# Patient Record
Sex: FEMALE | Race: WHITE
[De-identification: names, ages, dates, MRNs, and addresses within clinical notes are randomized per-mention and may not be internally consistent; named-entity substitution may affect disease eponyms.]

---

## 2018-08-22 ENCOUNTER — HOSPITAL ENCOUNTER (EMERGENCY)
Dept: HOSPITAL 97 - ER | Age: 83
Discharge: HOME | End: 2018-08-22
Payer: COMMERCIAL

## 2018-08-22 VITALS — SYSTOLIC BLOOD PRESSURE: 158 MMHG | DIASTOLIC BLOOD PRESSURE: 74 MMHG

## 2018-08-22 VITALS — TEMPERATURE: 98.4 F

## 2018-08-22 VITALS — OXYGEN SATURATION: 99 %
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
## 2018-08-22 DIAGNOSIS — S81.852A: Primary | ICD-10-CM

## 2018-08-22 DIAGNOSIS — E03.9: ICD-10-CM

## 2018-08-22 DIAGNOSIS — I10: ICD-10-CM

## 2018-08-22 DIAGNOSIS — Z88.0: ICD-10-CM

## 2018-08-22 DIAGNOSIS — Z23: ICD-10-CM
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
## 2018-08-22 PROCEDURE — 99284 EMERGENCY DEPT VISIT MOD MDM: CPT

## 2018-08-22 PROCEDURE — 90714 TD VACC NO PRESV 7 YRS+ IM: CPT

## 2018-08-22 NOTE — EDPHYS
Physician Documentation                                                                           

 [facility]                                                                

Name: Mirtha Suggs                                                                               

Age: 83 yrs                                                                                       

Sex: Female                                                                                       

: 1934                                                                                   

MRN: [id number]                                                                                   

Arrival Date: 2018                                                                          

Time: 18:32                                                                                       

Account#: [id number]                                                                            

Bed 14                                                                                            

Private MD: LISSETTE Rey C                                                                            

ED Physician Deepak Bird                                                                       

HPI:                                                                                              

                                                                                             

21:15 This 83 yrs old  Female presents to ER via Ambulatory with complaints of Dog   gs  

      Bite.                                                                                       

21:15 The patient was bitten on the left calf. Onset: The symptoms/episode began/occurred     gs  

      acutely, just prior to arrival. Animal information: The animal was reported to appear       

      healthy. Animal's vaccinations are up to date. Animal control has been notified.            

      Secondary to the bite the patient reports a puncture wound, that is deep. Associated        

      signs and symptoms: Pertinent positives: pain at site, Pertinent negatives: erythema at     

      site, fever, fluctuance, loss of consciousness, motor deficit, numbness distal to           

      wound, suspected foreign body. Severity of symptoms: At their worst the symptoms were       

      moderate, in the emergency department the symptoms are unchanged. The patient has           

      experienced similar episodes in the past, a few times.                                      

                                                                                                  

Historical:                                                                                       

- Allergies:                                                                                      

19:08 PENICILLINS;                                                                            aj  

- Home Meds:                                                                                      

19:08 levothyroxine oral [Active]; carvedilol 25 mg oral tab 1 tab 2 times per day [Active];  aj  

      olmesartan oral oral [Active]; nifedipine 30 mg Oral tr24 1 tab once daily [Active];        

- PMHx:                                                                                           

19:08 Hypertension; Hypothyroidism;                                                           aj  

- PSHx:                                                                                           

19:08 None;                                                                                   aj  

                                                                                                  

- Immunization history:: Adult Immunizations up to date.                                          

- Social history:: Smoking status: Patient/guardian denies using tobacco.                         

- Ebola Screening: : Patient negative for fever greater than or equal to 101.5 degrees            

  Fahrenheit, and additional compatible Ebola Virus Disease symptoms Patient denies               

  exposure to infectious person Patient denies travel to an Ebola-affected area in the            

  21 days before illness onset No symptoms or risks identified at this time.                      

                                                                                                  

                                                                                                  

ROS:                                                                                              

21:15 All other systems are negative.                                                         gs  

                                                                                                  

Exam:                                                                                             

21:15 Neck:  Trachea midline, no thyromegaly or masses palpated, and no cervical              gs  

      lymphadenopathy.  Supple, full range of motion without nuchal rigidity, or vertebral        

      point tenderness.  No Meningismus. Chest/axilla:  Normal chest wall appearance and          

      motion.  Nontender with no deformity.  No lesions are appreciated. Cardiovascular:          

      Regular rate and rhythm with a normal S1 and S2.  No gallops, murmurs, or rubs.  Normal     

      PMI, no JVD.  No pulse deficits. Respiratory:  Lungs have equal breath sounds               

      bilaterally, clear to auscultation and percussion.  No rales, rhonchi or wheezes noted.     

       No increased work of breathing, no retractions or nasal flaring. Abdomen/GI:  Soft,        

      non-tender, with normal bowel sounds.  No distension or tympany.  No guarding or            

      rebound.  No evidence of tenderness throughout. Back:  No spinal tenderness.  No            

      costovertebral tenderness.  Full range of motion.                                           

21:15 Constitutional: The patient appears alert, awake, uncomfortable.                            

21:15 Musculoskeletal/extremity: Extremities: noted in the left calf: puncture, ROM: no acute     

      changes, Circulation is intact in all extremities. Sensation intact.                        

21:15 Skin: injury, puncture(s), that are deep, of the left calf.                                 

                                                                                                  

Vital Signs:                                                                                      

19:08  / 80; Pulse 75; Resp 15; Temp 98.4; Pulse Ox 96% on R/A; Weight 60.33 kg; Height aj  

      5 ft. 4 in. (162.56 cm);                                                                    

19:43  / 64; Pulse 65; Resp 18; Pulse Ox 97% on R/A;                                    mg2 

20:45  / 68; Pulse 72; Resp 17; Pulse Ox 99% on R/A;                                    bs1 

21:45  / 74; Pulse 80; Resp 16; Pulse Ox 99% on R/A;                                    bs1 

19:08 Body Mass Index 22.83 (60.33 kg, 162.56 cm)                                               

                                                                                                  

MDM:                                                                                              

19:49 Patient medically screened.                                                               

21:15 Data reviewed: vital signs, nurses notes. Counseling: I had a detailed discussion with    

      the patient and/or guardian regarding: the historical points, exam findings, and any        

      diagnostic results supporting the discharge/admit diagnosis, radiology results.             

                                                                                                  

                                                                                             

19:50 Order name: Tib Fib Left XRAY; Complete Time: 20:59                                       

                                                                                                  

Administered Medications:                                                                         

20:04 Drug: Clindamycin 300 mg Route: PO;                                                     bs1 

22:02 Follow up: Response: No adverse reaction                                                bs1 

21:18 Drug: Tetanus-Diphtheria Toxoid Adult 0.5 ml {: [employer]. Exp:         bs1 

      2020. Lot #: [id number]. } Route: IM; Site: left deltoid;                                  

22:02 Follow up: Response: No adverse reaction                                                bs1 

                                                                                                  

                                                                                                  

Disposition:                                                                                      

18 21:25 Discharged to Home. Impression: Bitten by dog.                                     

- Condition is Stable.                                                                            

- Discharge Instructions: Animal Bite, Easy-to-Read.                                              

- Prescriptions for Clindamycin HCl 300 mg Oral Capsule - take 1 capsule by ORAL route            

  every 8 hours for 7 days; 21 capsule.                                                           

- Medication Reconciliation Form, Thank You Letter, Antibiotic Education, Prescription            

  Opioid Use form.                                                                                

- Follow up: Private Physician; When: 2 - 3 days; Reason: Re-evaluation by your                   

  physician.                                                                                      

                                                                                                  

                                                                                                  

                                                                                                  

Signatures:                                                                                       

Dispatcher [employer]                           Sophie Yates RN                       RN   Deepak Reyna MD MD gs Salazar, Brittany, RN                   RN   bs1                                                  

                                                                                                  

Corrections: (The following items were deleted from the chart)                                    

22:02 21:25 2018 21:25 Discharged to Home. Impression: Bitten by dog. Condition is      bs1 

      Stable. Forms are Medication Reconciliation Form, Thank You Letter, Antibiotic              

      Education, Prescription Opioid Use. Follow up: Private Physician; When: 2 - 3 days;         

      Reason: Re-evaluation by your physician. gs                                                 

                                                                                                  

**************************************************************************************************

## 2018-08-22 NOTE — RAD REPORT
EXAM DESCRIPTION:  Jim Ugarte Left8/22/2018 8:31 pm

 

CLINICAL HISTORY:   Left leg pain status post injury

 

FINDINGS:   No fracture is seen. A radiopaque foreign body is not noted

## 2018-08-29 ENCOUNTER — HOSPITAL ENCOUNTER (INPATIENT)
Dept: HOSPITAL 97 - 4TH | Age: 83
LOS: 1 days | Discharge: HOME HEALTH | DRG: 603 | End: 2018-08-30
Attending: INTERNAL MEDICINE | Admitting: INTERNAL MEDICINE
Payer: COMMERCIAL

## 2018-08-29 VITALS — BODY MASS INDEX: 23.6 KG/M2

## 2018-08-29 DIAGNOSIS — L02.416: Primary | ICD-10-CM

## 2018-08-29 DIAGNOSIS — S81.852A: ICD-10-CM

## 2018-08-29 DIAGNOSIS — I10: ICD-10-CM

## 2018-08-29 DIAGNOSIS — H35.30: ICD-10-CM

## 2018-08-29 DIAGNOSIS — E03.9: ICD-10-CM

## 2018-08-29 DIAGNOSIS — Z88.0: ICD-10-CM

## 2018-08-29 DIAGNOSIS — E78.5: ICD-10-CM

## 2018-08-29 LAB
BUN BLD-MCNC: 20 MG/DL (ref 7–18)
GLUCOSE SERPLBLD-MCNC: 86 MG/DL (ref 74–106)
HCT VFR BLD CALC: 39.4 % (ref 36–45)
LYMPHOCYTES # SPEC AUTO: 1.6 K/UL (ref 0.7–4.9)
MAGNESIUM SERPL-MCNC: 2.3 MG/DL (ref 1.8–2.4)
MCH RBC QN AUTO: 32.1 PG (ref 27–35)
MCV RBC: 90 FL (ref 80–100)
PMV BLD: 8 FL (ref 7.6–11.3)
POTASSIUM SERPL-SCNC: 3.6 MMOL/L (ref 3.5–5.1)
RBC # BLD: 4.38 M/UL (ref 3.86–4.86)

## 2018-08-29 PROCEDURE — 36415 COLL VENOUS BLD VENIPUNCTURE: CPT

## 2018-08-29 PROCEDURE — 87205 SMEAR GRAM STAIN: CPT

## 2018-08-29 PROCEDURE — 83735 ASSAY OF MAGNESIUM: CPT

## 2018-08-29 PROCEDURE — 85025 COMPLETE CBC W/AUTO DIFF WBC: CPT

## 2018-08-29 PROCEDURE — 88304 TISSUE EXAM BY PATHOLOGIST: CPT

## 2018-08-29 PROCEDURE — 76881 US COMPL JOINT R-T W/IMG: CPT

## 2018-08-29 PROCEDURE — 87070 CULTURE OTHR SPECIMN AEROBIC: CPT

## 2018-08-29 PROCEDURE — 87075 CULTR BACTERIA EXCEPT BLOOD: CPT

## 2018-08-29 PROCEDURE — 80048 BASIC METABOLIC PNL TOTAL CA: CPT

## 2018-08-29 RX ADMIN — SODIUM CHLORIDE SCH MLS: 0.9 INJECTION, SOLUTION INTRAVENOUS at 22:17

## 2018-08-30 VITALS — OXYGEN SATURATION: 96 %

## 2018-08-30 VITALS — SYSTOLIC BLOOD PRESSURE: 149 MMHG | TEMPERATURE: 98.3 F | DIASTOLIC BLOOD PRESSURE: 58 MMHG

## 2018-08-30 PROCEDURE — 0J9P3ZZ DRAINAGE OF LEFT LOWER LEG SUBCUTANEOUS TISSUE AND FASCIA, PERCUTANEOUS APPROACH: ICD-10-PCS

## 2018-08-30 PROCEDURE — 0Y9B3ZZ DRAINAGE OF LEFT LOWER EXTREMITY, PERCUTANEOUS APPROACH: ICD-10-PCS

## 2018-08-30 PROCEDURE — 0JDP3ZZ EXTRACTION OF LEFT LOWER LEG SUBCUTANEOUS TISSUE AND FASCIA, PERCUTANEOUS APPROACH: ICD-10-PCS

## 2018-08-30 RX ADMIN — SODIUM CHLORIDE SCH MLS: 0.9 INJECTION, SOLUTION INTRAVENOUS at 10:50

## 2018-08-30 NOTE — OP
Date of Procedure:  08/30/2018



Surgeon:  Ozzy Florence MD



Preoperative Diagnosis:  Traumatic puncture wound, left leg, dog bite with infected hematoma, abscess
, devitalized tissue.



Postoperative Diagnosis:  Traumatic puncture wound, left leg, dog bite with infected hematoma, absces
s, devitalized tissue.



Procedure:  Left leg wound exploration, subcutaneous debridement with drainage of an abscess and evac
uation of hematoma about 15 x 7 x 1 cm.



Findings:  Devitalized tissue, infected hematoma with abscess, and old blood, and then cultures were 
obtained.



Anesthesia:  General plus local.



Indications:  This is a case of an 83-year-old patient involved in a dog bite several days ago with h
ematoma, puncture wounds, and multiple devitalized tissue.  She tried antibiotics at home, but it did
 not improve, so yesterday her primary doctor asked me to see if we can do a surgical debridement of 
the area, evacuation of hematoma, since cellulitis was not improving.  The patient was admitted to [facility] and started on IV antibiotics.  She was fully explained the benefits, alternatives, and ri
sks of I and D of an abscess with evacuation of hematoma and debridement of the devitalized tissue.  
With benefits, alternatives, and risks including, but not limited to infection, bleeding, damage to a
djacent structures, anesthesia complication, nonhealing wound, MI, and even death.  She also understa
nds this may not relieve any symptoms.  She might need more than one surgical intervention.  She unde
rstood, signed a consent.  The area of concern was marked by me and the patient in the holding room.



Description Of Procedure:  The patient was brought to the operating room and placed in the supine pos
ition.  A time-out was called.  The patient was placed in lateral decubitus position with proper prot
ection.  Left leg was prepped and draped in a sterile fashion.  An incision was made in the skin in a
 wedge fashion to include all the vitalized tissue and multiple lacerations on the area that looked i
nfected.  That led us into a hematoma that was combined with an abscess.  The hematoma was evacuated.
  The abscess was with purulent discharge.  The purulent discharge was addressed and the loculation w
ere explored opened.  Hemostasis obtained.  Subcutaneous debridement was done until good tissue was f
ound.  The area was irrigated and hemostasis obtained and packed with wet-to-dry dressing.  The patie
nt tolerated the procedure well.  The patient was sent to recovery room in stable condition.





GREGG/KAYLENE

DD:  08/30/2018 08:17:32Voice ID:  [id number]

DT:  08/30/2018 11:53:39Report ID:  [id number]

## 2018-08-30 NOTE — HP
Date of Admission:  08/29/2018



Chief Complaint:  Leg pain.



History Of Present Illness:  An 83-year-old female patient who was walking in her neighborhood about 
a week ago and this was on 08/22/2018 and her neighbor's dog came and ended up biting her on her left
 leg.  She came into emergency room after days and this left leg wound was cleaned and dressing was a
pplied and she was released to go home.  Tetanus booster injection was given in emergency room.  The 
patient came to see me with her 2 daughters on 08/22/2018.  I did examine this area.  What she had wa
s very superficial wound, but she had some hematoma around it.  She was advised to take antibiotic.  
I wanted to prescribe her Levaquin, but at that time the patient and patient's daughter they both did
 not feel comfortable taking Levaquin and they wanted some different antibiotic, so cefuroxime was pr
escribed.  Today, the patient was brought into office again by the patient's daughter because the pat
garcia's granddaughter who is a nurse examined her leg yesterday and she was concerned about swelling, 
so the patient came in to see me today.  After I saw her, I was concerned about this area also on the
 left leg and was concerned about possibility of abscess and she was sent to hospital immediately for
 ultrasound and radiologist called and discussed results with me.  The patient has approximately 4 cm
 fluid collection in the area of concern and this could be either infected hematoma or abscess.  The 
patient was brought back to office, results were discussed with her, and I did talk to Dr. Florence, 
general surgeon, and he evaluated the patient at his office today and recommended the patient to be a
dmitted to hospital for incision and drainage and debridement of this area.  Details were discussed w
ith the patient and the patient's daughter regarding 2 possibilities, either it is infected hematoma 
or abscess.



Allergies:  TO PENICILLIN AND CODEINE.



Medications:  She takes carvedilol 25 mg 2 times a day, levothyroxine 75 mcg 1 tablet p.o. daily from
 Monday through Friday, none on Saturday or Sunday; nifedipine 30 mg p.o. daily, Benicar HCT 40/12.5 
one p.o. daily, and she takes eye drops.



Past Medical History:  Significant for hypertension, hyperlipidemia, hypothyroidism, allergic rhiniti
s, macular degeneration, insomnia.



Past Surgical History:  Negative.



Family History:  Significant for hypertension and glaucoma.



Social History:  Negative for smoking or alcohol use.



Review of Systems:

Dermatology:  As mentioned above. 

Musculoskeletal:  As mentioned above. 

All other systems reviewed and negative.



Physical Examination:

Vital Signs:  When she came into office today, height 5 feet 4 inches, weight 138.6 pounds, blood pre
ssure 153/79, pulse 60, temperature 97.8, respiratory rate 16. 

General:  Awake, alert, oriented, not in distress. 

HEENT:  Head atraumatic, normocephalic.  Conjunctivae nonerythematous.  Sclerae white.  Mouth, no thr
ush or edema noted.  Ears/Nose, no mass, lesion, discharge noted. 

Neck:  Supple. No JVD, lymph nodes, bruit, thyromegaly noted. 

Lungs:  Bilateral good equal air entry. Clear to auscultation. No rhonchi.  No rales. 

Heart:  Normal heart sounds, no murmur or gallop. 

Abdomen:  Soft, bowel sounds normal. No guarding, rigidity, tenderness, mass, hepatosplenomegaly, dis
tention, or bruit noted. 

Extremities:  Left calf has approximately 4-5 cm pink, warm, tender, slightly fluctuant swelling.  So
me bruising around this area, mostly in the upper part. 

Skin:  No rash, ulcer, cellulitis. 

Lymphatics:  No lymph node enlargement in neck, supraclavicular, infraclavicular region. 

Neuro:  No focal neurological deficit. 

Chest:  Unremarkable. 

External Genitalia:  Deferred. 

Rectal:  Deferred.



Laboratory Data:  White count 5.9, hemoglobin 14.1, platelets 229.  Sodium 140, potassium 3.6, chlori
de 103, bicarb 33, BUN 20, creatinine 0.80, glucose 86, magnesium 2.3.  Her left leg ultrasound shows
 approximately 4 cm area of fluid collection in the left calf.



Impression:  

1.Abscess, left leg.

2.Rule out infected hematoma, left leg.

3.Hypertension.

4.Hyperlipidemia.

5.Hypothyroidism.

6.Macular degeneration of eyes.



Plan:  Admit the patient to hospital for further evaluation and management of this problem.  The [facility]
ent is appropriate for inpatient and is expected to spend 2 midnights in hospital.  We will go ahead 
and keep her n.p.o. after midnight.  Empiric antibiotic will be given per order.  Home medications wi
ll be continued per order.  We will go ahead and give her IV fluid, IV Levaquin.  Pain medication jesus
l be ordered.  I will see her tomorrow for followup.  Consult Dr. Florence, and the patient will have
 surgery tomorrow for her left leg abscess versus infected hematoma.  Details and plan of treatment d
iscussed with the patient and her daughter.





MARIA GUADALUPE/KAYLENE

DD:  08/29/2018 21:12:32Voice ID:  [id number]

## 2018-08-30 NOTE — P.BOP
Preoperative diagnosis: traumatic puncture wound left leg (dog bite), hematoma, 

abscess


Postoperative diagnosis: same


Primary procedure: left leg wound exploration, subQ debridement with drainage 

of abscess


Secondary procedure: 15 x 7 x 1 cm, evacuation of hematoma


Estimated blood loss: <10cc


Specimen: deviltalized tissue, culture


Findings: as above


Anesthesia: General


Complications: None


Transferred to: Recovery Room


Condition: Good

## 2018-09-01 NOTE — DS
Date of Discharge:  08/30/2018



Disposition:  Discharged to go home.



Physical Examination:

HEENT:  Unremarkable. 

Lungs:  Clear to auscultation. 

Heart:  Sounds normal. 

Abdomen:  Soft.  Bowel sounds normal.  No guarding, rigidity, tenderness, or distention. 

Extremities:  No leg edema.  Left leg dressing present.



Discharge Medications And Instructions:  

1.Continue all prior home medications.

2.Take Levaquin 500 mg p.o. daily as prescribed prior to this admission.

3.Tylenol 500 mg 1 tablet by mouth 4 times a day as needed for pain.

4.May use tramadol 50 mg 1 tablet by mouth 4 times a day as needed for pain and prescription for 15 
tablet given to patient's daughter and to use it only if she needs it as patient does not think she w
ill need stronger pain medication.

5.Follow up with Dr. Florence per his instruction and follow up at my office in 2 weeks and wound ca
re dressing changes as per instruction from Dr. Florence.



Hospital Course:  An 83-year-old female patient, admitted to hospital with problem with left leg pain
.  The patient had dog bite approximately a week ago and she received oral antibiotic cefuroxime, whi
ch she did not improve and came into office with concerns about pain, swelling, redness, and upon fur
ther evaluation also concerned about possibility of fluid collections and abscess, so stat ultrasound
 of leg was done, which came back positive for 4 cm fluid collection.  This could be either infected 
hematoma or it could be abscess after Dr. Florence was consulted at his office.  The patient was admi
tted to the hospital and the patient received IV Levaquin and today she had surgery done by Dr. nAh lyman for incision and drainage and debridement of this area, and after surgery, she was discharged to 
go home in stable condition.



Final Diagnoses:  

1.Abscess, left leg.

2.Rule out infected hematoma, left leg.

3.Hypertension.

4.Hyperlipidemia.

5.Hypothyroidism.

6.Macular degeneration of eyes.





MARIA GUADALUPE/MODL

DD:  08/31/2018 05:04:39Voice ID:  [id number]

DT:  09/01/2018 02:13:40Report ID:  [id number]

## 2020-01-01 NOTE — ER
Nurse's Notes                                                                                     

 [facility]                                                                

Name: Mirtha Suggs                                                                               

Age: 83 yrs                                                                                       

Sex: Female                                                                                       

: 1934                                                                                   

MRN: [id number]                                                                                   

Arrival Date: 2018                                                                          

Time: 18:32                                                                                       

Account#: [id number]                                                                            

Bed 14                                                                                            

Private MD: LISSETTE Rey C                                                                            

Diagnosis: Bitten by dog                                                                          

                                                                                                  

Presentation:                                                                                     

                                                                                             

19:05 Presenting complaint: Patient states: Bit by neighbors dog on back of left calf today   aj  

      just PTA. Small puncture wound with controlled bleeding. Police report filed.               

      Transition of care: patient was not received from another setting of care. Onset of         

      symptoms was 2018. Risk Assessment: Do you want to hurt yourself or someone      

      else? Patient reports no desire to harm self or others. Initial Sepsis Screen: Does the     

      patient meet any 2 criteria? No. Patient's initial sepsis screen is negative. Does the      

      patient have a suspected source of infection? No. Patient's initial sepsis screen is        

      negative. Care prior to arrival: None.                                                      

19:05 Method Of Arrival: Ambulatory                                                           aj  

19:05 Acuity: PARVEZ 4                                                                           aj  

                                                                                                  

Triage Assessment:                                                                                

19:08 Bite description: bite sustained to left calf is from animal, was sustained 30-60       aj  

      minutes ago. by a dog, animal information: vaccination(s) is unknown. General: Appears      

      in no apparent distress. comfortable, Behavior is calm, cooperative, appropriate for        

      age. Pain: Complains of pain in left calf. Neuro: Level of Consciousness is awake,          

      alert, obeys commands, Oriented to person, place, time, situation, Appropriate for age.     

      Respiratory: Airway is patent Respiratory effort is even, unlabored, Respiratory            

      pattern is regular, symmetrical. Derm: Skin is intact, is healthy with good turgor,         

      Skin is pink, warm \T\ dry. normal. Injury Description: Bite sustained to left calf         

      caused by a dog, is from animal, was sustained 30-60 minutes ago.                           

                                                                                                  

Historical:                                                                                       

- Allergies:                                                                                      

19:08 PENICILLINS;                                                                            aj  

- Home Meds:                                                                                      

19: levothyroxine oral [Active]; carvedilol 25 mg oral tab 1 tab 2 times per day [Active];  aj  

      olmesartan oral oral [Active]; nifedipine 30 mg Oral tr24 1 tab once daily [Active];        

- PMHx:                                                                                           

19:08 Hypertension; Hypothyroidism;                                                           aj  

- PSHx:                                                                                           

19:08 None;                                                                                   aj  

                                                                                                  

- Immunization history:: Adult Immunizations up to date.                                          

- Social history:: Smoking status: Patient/guardian denies using tobacco.                         

- Ebola Screening: : Patient negative for fever greater than or equal to 101.5 degrees            

  Fahrenheit, and additional compatible Ebola Virus Disease symptoms Patient denies               

  exposure to infectious person Patient denies travel to an Ebola-affected area in the            

  21 days before illness onset No symptoms or risks identified at this time.                      

                                                                                                  

                                                                                                  

Screenin:46 Abuse screen: Denies threats or abuse. Denies injuries from another. Nutritional        bs1 

      screening: No deficits noted. Tuberculosis screening: No symptoms or risk factors           

      identified. Fall Risk None identified.                                                      

                                                                                                  

Assessment:                                                                                       

19:15 General: Appears in no apparent distress. uncomfortable, slender, well groomed,         [facility] 

      Behavior is calm, cooperative, appropriate for age. Pain: Complains of pain in left leg     

      and left calf. Neuro: Level of Consciousness is awake, alert, obeys commands, Oriented      

      to person, place, time, situation, Appropriate for age Facial symmetry appears normal.      

      Cardiovascular: Denies chest pain, shortness of breath, Heart tones S1 S2 present           

      Capillary refill < 3 seconds Patient's skin is warm and dry. Respiratory: Airway is         

      patent Trachea midline Respiratory effort is even, unlabored, Respiratory pattern is        

      regular, symmetrical, Breath sounds are clear bilaterally. GI: No signs and/or symptoms     

      were reported involving the gastrointestinal system. : No signs and/or symptoms were      

      reported regarding the genitourinary system. EENT: No signs and/or symptoms were            

      reported regarding the EENT system. Derm: puncture wound noted to left calf, bleeding       

      controlled. Derm: Bruising that is dark purple, on left leg and left calf.                  

      Musculoskeletal: Musculoskeletal: Circulation, motion, and sensation intact. Capillary      

      refill < 3 seconds, Swelling present in left leg and left calf.                             

19:45 Reassessment: Patient/family state that a police report was done prior to arrival to    [address], incident occurred approximately around 18 in [facility].                                                                                     

20:30 Reassessment: Patient appears in no apparent distress at this time. Patient and/or      bs1 

      family updated on plan of care and expected duration. Pain level reassessed. Patient is     

      alert, oriented x 3, equal unlabored respirations, skin warm/dry/pink. Updated              

      patient/family on POC.                                                                      

21:15 Reassessment: Cleansed patients left leg/calf with NS/Cholhexidine. Tolerated well.     bs1 

      wrapped in gauze/kerlix. Clean, dry and intact.                                             

21:40 Reassessment: Informed patient/family on discharge instructions/POC. keeping leg clean  bs1 

      and dry, wrap daily, clean with water/antibacterial soap. Patient states feeling            

      better. Patient states symptoms have improved.                                              

                                                                                                  

Vital Signs:                                                                                      

19:08  / 80; Pulse 75; Resp 15; Temp 98.4; Pulse Ox 96% on R/A; Weight 60.33 kg; Height aj  

      5 ft. 4 in. (162.56 cm);                                                                    

19:43  / 64; Pulse 65; Resp 18; Pulse Ox 97% on R/A;                                    mg2 

20:45  / 68; Pulse 72; Resp 17; Pulse Ox 99% on R/A;                                    bs1 

21:45  / 74; Pulse 80; Resp 16; Pulse Ox 99% on R/A;                                    bs1 

19:08 Body Mass Index 22.83 (60.33 kg, 162.56 cm)                                               

                                                                                                  

ED Course:                                                                                        

18:32 Patient arrived in ED.                                                                  mr  

18:32 LISSETTE Rey MD is Private Physician.                                                       mr  

19:06 Triage completed.                                                                       aj  

19:08 Arm band placed on left wrist. Patient placed in an exam room.                          aj  

19:12 Deepak Bird MD is Attending Physician.                                              gs  

19:30 Patient has correct armband on for positive identification. Bed in low position. Call   bs1 

      light in reach. Side rails up X 1. Cardiac monitor on. Pulse ox on.                         

20:26 X-ray completed. Portable x-ray completed in exam room. Patient tolerated procedure     bb2 

      well.                                                                                       

20:27 Tib Fib Left XRAY In Process Unspecified.                                               EDMS

21:18 Lolly Bergeron, RN is Primary Nurse.                                                 bs1 

22:01 No provider procedures requiring assistance completed. Patient did not have IV access   bs1 

      during this emergency room visit.                                                           

                                                                                                  

Administered Medications:                                                                         

20:04 Drug: Clindamycin 300 mg Route: PO;                                                     bs1 

22:02 Follow up: Response: No adverse reaction                                                bs1 

21:18 Drug: Tetanus-Diphtheria Toxoid Adult 0.5 ml {: [employer] Exp:         bs1 

      2020. Lot #: [id number]. } Route: IM; Site: left deltoid;                                  

22:02 Follow up: Response: No adverse reaction                                                bs1 

                                                                                                  

                                                                                                  

Outcome:                                                                                          

21:25 Discharge ordered by MD.                                                                  

22:01 Discharged to home ambulatory, with family.                                             bs1 

22:01 Condition: stable                                                                           

22:01 Discharge instructions given to patient, family, Instructed on discharge instructions,      

      follow up and referral plans. medication usage, Demonstrated understanding of               

      instructions, follow-up care, medications, Prescriptions given X 1.                         

22:02 Patient left the ED.                                                                    bs1 

                                                                                                  

Signatures:                                                                                       

Dispatcher [employer]                           EDSophie Shore, RN                       RN   Meron Lima Gregory, MD MD gs Bock, Brittany                               bbLolly Saunders, RN                   RN   bs1                                                  

Frandy Goode RN                    RN   mg2                                                  

                                                                                                  

************************************************************************************************** There are no preventive care reminders to display for this patient.    Patient is up to date, no discussion needed.

## 2021-04-06 ENCOUNTER — HOSPITAL ENCOUNTER (OUTPATIENT)
Dept: HOSPITAL 97 - 2ND | Age: 86
Setting detail: OBSERVATION
LOS: 1 days | Discharge: HOME | End: 2021-04-07
Attending: INTERNAL MEDICINE | Admitting: INTERNAL MEDICINE
Payer: COMMERCIAL

## 2021-04-06 VITALS — BODY MASS INDEX: 25.6 KG/M2

## 2021-04-06 DIAGNOSIS — R79.1: ICD-10-CM

## 2021-04-06 DIAGNOSIS — M47.816: ICD-10-CM

## 2021-04-06 DIAGNOSIS — I10: ICD-10-CM

## 2021-04-06 DIAGNOSIS — E87.6: ICD-10-CM

## 2021-04-06 DIAGNOSIS — N39.0: ICD-10-CM

## 2021-04-06 DIAGNOSIS — E78.00: ICD-10-CM

## 2021-04-06 DIAGNOSIS — I65.23: ICD-10-CM

## 2021-04-06 DIAGNOSIS — R55: Primary | ICD-10-CM

## 2021-04-06 DIAGNOSIS — E78.5: ICD-10-CM

## 2021-04-06 DIAGNOSIS — R77.8: ICD-10-CM

## 2021-04-06 DIAGNOSIS — I35.0: ICD-10-CM

## 2021-04-06 LAB
ALBUMIN SERPL BCP-MCNC: 3.4 G/DL (ref 3.4–5)
ALP SERPL-CCNC: 55 U/L (ref 45–117)
ALT SERPL W P-5'-P-CCNC: 25 U/L (ref 12–78)
AST SERPL W P-5'-P-CCNC: 20 U/L (ref 15–37)
BUN BLD-MCNC: 21 MG/DL (ref 7–18)
CKMB CREATINE KINASE MB: 3 NG/ML (ref 0.3–3.6)
GLUCOSE SERPLBLD-MCNC: 101 MG/DL (ref 74–106)
HCT VFR BLD CALC: 36 % (ref 36–45)
LYMPHOCYTES # SPEC AUTO: 1.4 K/UL (ref 0.7–4.9)
MAGNESIUM SERPL-MCNC: 2 MG/DL (ref 1.8–2.4)
NT-PROBNP SERPL-MCNC: 257 PG/ML (ref ?–450)
PMV BLD: 7.7 FL (ref 7.6–11.3)
POTASSIUM SERPL-SCNC: 3.1 MMOL/L (ref 3.5–5.1)
RBC # BLD: 4.05 M/UL (ref 3.86–4.86)
TROPONIN I: 0.2 NG/ML (ref 0–0.04)
TSH SERPL DL<=0.05 MIU/L-ACNC: 0.88 UIU/ML (ref 0.36–3.74)

## 2021-04-06 PROCEDURE — 80048 BASIC METABOLIC PNL TOTAL CA: CPT

## 2021-04-06 PROCEDURE — 81001 URINALYSIS AUTO W/SCOPE: CPT

## 2021-04-06 PROCEDURE — 85379 FIBRIN DEGRADATION QUANT: CPT

## 2021-04-06 PROCEDURE — 84484 ASSAY OF TROPONIN QUANT: CPT

## 2021-04-06 PROCEDURE — 93005 ELECTROCARDIOGRAM TRACING: CPT

## 2021-04-06 PROCEDURE — 83735 ASSAY OF MAGNESIUM: CPT

## 2021-04-06 PROCEDURE — 87086 URINE CULTURE/COLONY COUNT: CPT

## 2021-04-06 PROCEDURE — 87088 URINE BACTERIA CULTURE: CPT

## 2021-04-06 PROCEDURE — 36415 COLL VENOUS BLD VENIPUNCTURE: CPT

## 2021-04-06 PROCEDURE — 93880 EXTRACRANIAL BILAT STUDY: CPT

## 2021-04-06 PROCEDURE — 93306 TTE W/DOPPLER COMPLETE: CPT

## 2021-04-06 PROCEDURE — 84443 ASSAY THYROID STIM HORMONE: CPT

## 2021-04-06 PROCEDURE — 80053 COMPREHEN METABOLIC PANEL: CPT

## 2021-04-06 PROCEDURE — 82553 CREATINE MB FRACTION: CPT

## 2021-04-06 PROCEDURE — 84145 PROCALCITONIN (PCT): CPT

## 2021-04-06 PROCEDURE — 93970 EXTREMITY STUDY: CPT

## 2021-04-06 PROCEDURE — 83880 ASSAY OF NATRIURETIC PEPTIDE: CPT

## 2021-04-06 PROCEDURE — 85025 COMPLETE CBC W/AUTO DIFF WBC: CPT

## 2021-04-06 PROCEDURE — 82550 ASSAY OF CK (CPK): CPT

## 2021-04-06 PROCEDURE — 71275 CT ANGIOGRAPHY CHEST: CPT

## 2021-04-06 PROCEDURE — 70450 CT HEAD/BRAIN W/O DYE: CPT

## 2021-04-06 PROCEDURE — 84132 ASSAY OF SERUM POTASSIUM: CPT

## 2021-04-06 PROCEDURE — 71046 X-RAY EXAM CHEST 2 VIEWS: CPT

## 2021-04-07 VITALS — DIASTOLIC BLOOD PRESSURE: 64 MMHG | TEMPERATURE: 99.4 F | SYSTOLIC BLOOD PRESSURE: 135 MMHG

## 2021-04-07 VITALS — OXYGEN SATURATION: 93 %

## 2021-04-07 LAB
BUN BLD-MCNC: 16 MG/DL (ref 7–18)
GLUCOSE SERPLBLD-MCNC: 100 MG/DL (ref 74–106)
POTASSIUM SERPL-SCNC: 2.7 MMOL/L (ref 3.5–5.1)
TROPONIN I: 0.12 NG/ML (ref 0–0.04)

## 2021-04-07 RX ADMIN — POTASSIUM CHLORIDE SCH: 200 INJECTION, SOLUTION INTRAVENOUS at 09:00

## 2021-04-07 RX ADMIN — POTASSIUM CHLORIDE SCH MLS: 200 INJECTION, SOLUTION INTRAVENOUS at 08:27

## 2021-04-07 NOTE — RAD REPORT
EXAM DESCRIPTION:  CT - Head Brain Wo Cont - 4/7/2021 6:59 am

 

CLINICAL HISTORY:  The patient is 86 years old and is Female; syncope

 

TECHNIQUE:  Axial computed tomography images of the head/brain without intravenous contrast.   Sagitt
al and coronal reformatted images were created and reviewed.   This CT exam was performed using one o
r more of the following dose reduction techniques:   automated exposure control, adjustment of the mA
 and/or kV according to patient size, and/or use of iterative reconstruction technique.

 

COMPARISON:  No relevant prior studies available.

 

FINDINGS:  BRAIN:   Diffuse cerebral atrophy is noted. There are nonspecific periventricular white ma
tter changes, which are likely related to chronic small vessel disease. The gray-white differentiatio
n is maintained. There are no extra-axial fluid collections or acute hemorrhage.

   VENTRICLES:   There is diffuse prominence of the ventricles, which is likely related to central at
rophy.

   BONES/JOINTS:   No acute fracture.

   SOFT TISSUES:   Unremarkable.

   SINUSES:   Unremarkable as visualized.   No acute sinusitis.

   MASTOID AIR CELLS:   Unremarkable as visualized.   No mastoid effusion.

   ORBITS:   Unremarkable as visualized.

 

IMPRESSION:  1.   No acute intracranial findings visualized.

2.   Nonspecific periventricular white matter changes, likely related to chronic small vessel disease
.

3.   Diffuse cerebral atrophy.

 

Electronically signed by:   Margaret Gonzalez MD   4/6/2021 10:45 PM CDT Workstation: [id number]

 

 

Due to temporary technical issues with the PACS/Fluency reporting system, reports are being signed by
 the in house radiologist without review as a courtesy to ensure prompt reporting. The interpreting r
adiologist is fully responsible for the content of the report.

## 2021-04-07 NOTE — HP
Date of Admission:  04/06/2021



Chief Complaint:  Fainting episode.



History Of Present Illness:  This is an 86-year-old very pleasant female patient who lives at home by
 herself, was brought into office today by her daughter after she had a fainting episode today.  The 
patient reports that since the weather was so good, she [facility] and she had little bit feeling of shortness of breath, but denies any chest pain or any palpita
tion type of feeling, but subsequently she decided to continue to walk but all of a sudden without an
y prior warning or prior symptoms, she had fainting episode and she fell forward ended up hitting her
 chin on to the concrete floor and hence bruising on her hands.  Neighbor saw her and the patient's d
aughter was contacted and later on, daughter brought her to my office.  She denies any fever, chills,
 nausea, vomiting.  No diarrhea.  No blood in stool or blood in the urine.  No chest pain.



Allergies:  TO PENICILLIN CAUSING RASH AND CODEINE CAUSING DIZZINESS.



Medications:  Amlodipine 5 mg daily in the morning, carvedilol 25 mg 2 times a day, olmesartan/hydroc
hlorothiazide 40/12.5 takes 1 tablet by mouth daily, levothyroxine 75 mcg daily, and she takes some e
yedrops, which is timolol and latanoprost.



Review of Systems:

Cardiovascular:  As mentioned above. 

Respiratory:  As mentioned above. 

Constitutional:  Complaining of some generalized weakness. 



All other systems reviewed and negative.



Past Medical History:  Significant for hypothyroidism, hypertension, hyperlipidemia, leg edema, and l
umbar spondylosis.



Past Surgical History:  Negative.



Family History:  Father and mother both had hypertension.



Social History:  Negative for smoking and alcohol use.



Physical Examination:

Initial vital signs:  Upon admission, temperature 99.6, pulse 67, respiratory rate 18, blood pressure
 161/71, oxygen saturation 96%.  Height 5 feet 2 inches, weight 140 pounds. 

General:  Awake, alert, oriented, not in distress. 

HEENT:  On the face examination has a small area of bruising on her chin.  No open wound noted.  Ther
e is a pinpoint area over the chin that is where she had a tiny laceration and had some bleeding afte
r the fall, but there was no active bleeding noted when I saw her. 

Neck:  Supple. No JVD, lymph nodes, bruit, thyromegaly noted. 

Lungs:  Bilateral good equal air entry. Clear to auscultation. No rhonchi.  No rales. 

Heart:  Presence of systolic murmur.  No gallop. 

Abdomen:  Soft, bowel sounds normal. No guarding, rigidity, tenderness, mass, hepatosplenomegaly, dis
tention, or bruit noted. 

Extremities:  No leg edema.  No calf tenderness. 

Skin:  Reveals presence of bruising on her palm of her hand. 

Lymphatics:  No lymph node enlargement in neck, supraclavicular, infraclavicular region. 

Neuro:  No focal neurological deficit. 

Chest:  Unremarkable. 

External Genitalia:  Deferred. 

Rectal:  Deferred.



Laboratory Data:  Chest x-ray no acute cardiopulmonary changes.  CAT scan of the head was done withou
t contrast, which came back negative for any acute intracranial changes.  White count 7.5, hemoglobin
 12.9, platelets 202.  Sodium 137, potassium 3.1, chloride 102, bicarb 27, BUN 21, creatinine 0.71, g
lucose 101.  D-dimer elevated at 1816.  Troponin 0.20.  Procalcitonin less than 0.05.  Liver function
 tests normal.  TSH 0.882.



Impression:  

1.Syncope.

2.Hypokalemia.

3.Hypertension.

4.Hypothyroidism.

5.Hyperlipidemia.

6.Lumbar spondylosis.



Plan:  We will admit patient to hospital for further evaluation and management of this problem.  The 
patient will be admitted to telemetry.  We will monitor her for any kind of cardiac arrhythmia.  Repl
ace electrolyte, which is potassium per protocol.  We will repeat blood work tomorrow morning includi
ng her electrolytes as well as cardiac enzymes will be repeated in the morning.  Troponin is slightly
 elevated.  D-dimer is elevated and we need to rule out any possibility of underlying pulmonary embol
ism as patient started to have some shortness of breath and then had this syncopal episode.  We will 
get a CT scan of the chest per PE protocol and 1 dose of Lovenox 60 subcu was ordered to be given ton
ight while waiting on the further workup.  We will also need to rule out any critical aortic stenosis
.  Echo with Doppler was ordered.  We will get cardiology consult tomorrow and details of plan of deja
atment was discussed with the patient and the patient's daughter.





MARIA GUADALUPE/MODL

DD:  04/07/2021 05:57:34Voice ID:  [id number]

## 2021-04-07 NOTE — RAD REPORT
EXAM DESCRIPTION:  RAD - Chest Pa And Lat (2 Views) - 4/6/2021 9:57 pm

 

CLINICAL HISTORY:  Syncope.

 

COMPARISON:  None.

 

TECHNIQUE:  Two views: PA and lateral chest radiograph(s).

 

FINDINGS:  Moderate perihilar interstitial thickening. No confluent infiltrate. No pleural effusion. 
No pneumothorax. Nonenlarged cardiomediastinal silhouette. No significant osseous abnormality.

 

IMPRESSION:  Moderate perihilar interstitial thickening.

 

Electronically signed by:   Beckie Lyn MD   4/6/2021 10:04 PM CDT Workstation: [id number]

 

 

Due to temporary technical issues with the PACS/Fluency reporting system, reports are being signed by
 the in house radiologist without review as a courtesy to ensure prompt reporting. The interpreting r
adiologist is fully responsible for the content of the report.

## 2021-04-07 NOTE — RAD REPORT
EXAM DESCRIPTION:  CT - Chest For Pe Angio - 4/7/2021 6:56 am

 

CLINICAL HISTORY:  The patient is 86 years old and is Female; syncope, rule out PE

 

TECHNIQUE:  Axial computed tomographic angiography images of the chest with intravenous contrast.   S
agittal and coronal reformatted images were created and reviewed.   This CT exam was performed using 
one or more of the following dose reduction techniques:   automated exposure control, adjustment of t
he mA and/or kV according to patient size, and/or use of iterative reconstruction technique.   MIP re
constructed images were created and reviewed.

 

COMPARISON:  No relevant prior studies available.

 

FINDINGS:  Pulmonary arteries:   Unremarkable.   No pulmonary embolism.

   Aorta:   Scattered atherosclerotic vascular calcifications.

         No thoracic aortic aneurysm.

   Lungs:   Calcified granuloma in the left lung.

         No mass.

   Pleural space:   Unremarkable.   No significant effusion.   No pneumothorax.

   Heart:   Unremarkable.   No cardiomegaly.   No significant pericardial effusion.   No evidence of 
RV dysfunction.

   Thyroid:   Coarse calcification within the left thyroid. Suggestion of a 9 mm high density or calc
ified nodule in the right thyroid.    ACR White Paper guidelines (Carter JK, et al. JACR 2015;12(2):14
3-50) suggest further evaluation with thyroid ultrasound.

   Bones/joints:   No acute fracture.   No dislocation.

   Soft tissues:   Calcifications within the breast tissue.

   Lymph nodes:   Unremarkable.   No enlarged lymph nodes.

 

IMPRESSION:  No acute finding. No evidence of pulmonary embolism.

 

Electronically signed by:   Indra Chao MD   4/7/2021 6:35 AM CDT Workstation: [id number]

 

 

Due to temporary technical issues with the PACS/Fluency reporting system, reports are being signed by
 the in house radiologist without review as a courtesy to ensure prompt reporting. The interpreting r
adiologist is fully responsible for the content of the report.

## 2021-04-07 NOTE — RAD REPORT
EXAM DESCRIPTION:   - CP - 4/7/2021 1:16 pm

 

CLINICAL HISTORY:  syncope

 

COMPARISON:  No comparisons

 

TECHNIQUE:  Real-time sonographic evaluation of bilateral carotid and vertebral systems was performed
. Gray scale and Doppler interrogation were performed with waveform tracing bilaterally.

 

FINDINGS:  Normal high resistance waveforms are noted in both external carotid arteries. The common c
arotid arteries and internal carotid arteries show normal low resistance waveforms.

 

Calcified and noncalcified plaquing changes are present in the bilateral carotid bulbs and proximal i
nternal carotid arteries. Visually there is no significant degree of luminal narrowing. No dissection
 is seen. Peak systolic and end-diastolic velocity values fall within a normal range. ICA/ CCA ratio 
1.7 on the right is elevated though this is not believed be the result of a hemodynamically significa
nt stenosis. ICA/CCA ratio is 1.2 on the left common normal.

 

Antegrade flow seen in both vertebral arteries.

 

Velocity values and ratios were recorded and are retained in the patient's imaging records.

 

 

IMPRESSION:  Bilateral bulb and proximal ICA calcified plaquing changes. Visually there is no signifi
cant luminal narrowing.

 

No evidence of a hemodynamically significant stenosis.

## 2021-04-07 NOTE — RAD REPORT
EXAM DESCRIPTION:  USExtrem Venous W Compress Bil4/7/2021 9:08 am

 

CLINICAL HISTORY:  Leg swelling and pain

 

COMPARISON:  none

 

FINDINGS:  The common femoral, superficial femoral, popliteal and posterior tibial veins bilaterally 
are compressible and demonstrate augmentation.

 

Doppler demonstrates good flow.

 

IMPRESSION:  No evidence of deep venous thrombosis involving  either lower extremity.

## 2021-04-07 NOTE — ECHO
HEIGHT: 5 ft 2 in   WEIGHT: 140 lb 0 oz   DATE OF STUDY: 04/07/2021   REFER DR: Johny Rey MD

2-DIMENSIONAL: YES

     M.MODE: YES

 DOPPLER: YES

COLOR FLOW: YES



                    TDS:  NO

PORTABLE: NO

 DEFINITY:  NO

BUBBLE STUDY: NO





DIAGNOSIS:  SYNCOPE



CARDIAC HISTORY:  

CATHERIZATION: NO

SURGERY: NO

PROSTHETIC VALVE: NO

PACEMAKER: NO





MEASUREMENTS (cm)

    DIASTOLIC (NORMALS)                 SYSTOLIC (NORMALS)

IVSd                 1.2 (0.6-1.2)                    LA Diam 3.0 (1.9-4.0)     LVEF       
  72%  

LVIDd               3.6 (3.5-5.7)                        LVIDs      2.1 (2.0-3.5)     %FS  
        41%

LVPWd             1.0 (0.6-1.2)

Ao Diam           2.6 (2.0-3.7)



2 DIMENSIONAL ASSESSMENT:

RIGHT ATRIUM:                   NORMAL

LEFT ATRIUM:       NORMAL



RIGHT VENTRICLE:            NORMAL

LEFT VENTRICLE: NORMAL



TRICUSPID VALVE:             NORMAL

MITRAL VALVE:    MITRAL ANNULAR CALCIFICATION



PULMONIC VALVE:             NORMAL

AORTIC VALVE:     MILD STENOSIS



PERICARDIAL EFFUSION: NONE

AORTIC ROOT:      NORMAL





LEFT VENTRICULAR WALL MOTION:     NORMAL



DOPPLER/COLOR FLOW:     MILD AORTIC STENOSIS. AORTIC VALVE AREA 1.8 CENTIMETERS SQUARED. 
MITRAL ANNULAR CALCIFICATION.



COMMENTS:      MILD AORTIC STENOSIS. AORTIC VALVE AREA 1.8 CENTIMETERS SQUARED. MITRAL 
ANNULAR CALCIFICATION. NO WALL MOTION ABNORMALITY. NORMAL LEFT VENTRICULAR SIZE AND 
FUNCTION. NO EFFUSION.



TECHNOLOGIST:   CORTEZ DO

## 2021-04-07 NOTE — EKG
Test Date:    2021-04-06               Test Time:    19:46:22

Technician:   DESI                                    

                                                     

MEASUREMENT RESULTS:                                       

Intervals:                                           

Rate:         64                                     

VA:           196                                    

QRSD:         84                                     

QT:           432                                    

QTc:          445                                    

Axis:                                                

P:            59                                     

VA:           196                                    

QRS:          -28                                    

T:            57                                     

                                                     

INTERPRETIVE STATEMENTS:                                       

                                                     

Normal sinus rhythm

Normal ECG

Compared to ECG 04/11/2012 15:34:45

Sinus bradycardia no longer present

Left-axis deviation no longer present



Electronically Signed On 04-07-21 12:45:31 CDT by Alex Major

## 2021-04-08 NOTE — DS
Date of Discharge:  04/07/2021



Disposition:  Discharged to go home.



Physical Examination:

HEENT:  Unremarkable except examination of change shows area of bruising.  No active bleeding. 

Lungs:  Clear to auscultation. 

Heart:  Heart sounds normal. 

Abdomen:  Soft, bowel sounds normal.  No guarding, rigidity, tenderness, distention.  Extremities:  N
o leg edema.



Laboratory Data:  Yesterday, sodium 137, potassium 3.1, chloride 102, bicarb 27, BUN 21, creatinine 0
.71, glucose 101.  Liver function tests unremarkable.  Troponin 0.20.  Today, troponin 0.12.  Procalc
itonin less than 0.05.  TSH 0.882.  Today, sodium 137, potassium 2.7, chloride 104, bicarb 28, BUN 16
, creatinine 0.61, glucose 100.  After potassium was corrected last potassium today was 3.7 at 5 p.m.
  White count yesterday was 7.5, hemoglobin 12.9, platelets 202.  D-dimer was 1816.  Urinalysis abnor
mal consistent with urinary tract infection.  COVID-19 test was not done because the patient refused 
it.  Chest x-ray, no acute cardiopulmonary changes.  CAT scan of the chest, head was negative for any
 acute intracranial changes.  CT scan of the chest per PE protocol was negative for pulmonary embolis
m and carotid Doppler bilateral carotid artery plaquing, no evidence of hemodynamically significant s
tenotic lesion.  Echocardiogram shows mild aortic stenosis as per my discussion with Dr. Major.



Discharge Medications And Instructions:  

1.Continue all prior home medication except stop amlodipine, stop olmesartan/hydrochlorothiazide.

2.Follow up at my office tomorrow afternoon.



Final Diagnoses:  

1.Syncope.

2.Hypokalemia.

3.Urinary tract infection.

4.Hypertension.

5.Hypothyroidism.

6.Aortic stenosis, mild.

7.Bilateral carotid artery disease.



Hospital Course:  86-year-old female patient admitted to the hospital with shortness of breath and sy
ncope.  Please see dictated H and P for more information.  After patient was evaluated yesterday at [facility], she was admitted to the hospital.  Initial evaluation revealed hypokalemia and the patient was
 given IV fluid hydration.  Potassium was corrected with electrolyte replacement protocol.  Cardiolog
y consultation was requested from Dr. Major and he tried to see patient 2 different times, but she 
was downstairs in Radiology Department getting some tests done, so he did not have opportunity to see
 her, but he will see her on outpatient basis as per my discussion with him.  He did review her echoc
ardiogram and he is planning to do a 1 week Holter monitor on an outpatient basis.  The patient's cari
ous Doppler of leg was negative for DVT.  Overall, her condition has remained stable.  She has not re
ceived any antihypertensive medications since hospital admission.  One dose of Levaquin 500 mg p.o. w
as ordered this evening prior to discharge and I did try to call the patient's daughter to discuss al
l the details about the test results and plan of treatment, but I was not able to contact the patient
's daughter.  The patient is medically stable for discharge and I will see her tomorrow at office.





MARIA GUADALUPE/MODL

DD:  04/07/2021 19:15:14Voice ID:  [id number]

DT:  04/08/2021 04:22:06Report ID:  [id number]

## 2021-04-08 NOTE — CON
Date of Consultation:  04/07/2021



Reason For Consultation:  Syncope.



History Of Present Illness:  Ms. Suggs is an 86-year-old woman, who has a history of hypertension, 
hypercholesterolemia, leg edema, lumbar spondylosis, and hypothyroidism.  The patient lives at home b
y herself.  She was brought today by her daughter who stated she had a fainting episode.  She did hav
e some shortness of breath, some mild exertion without any chest pain or palpitation, but she suddenl
y had a syncopal episode where she fell forward hitting her chin on the concrete and she bruised her 
hands on the fence.  The patient denied any fever or chills or nausea or vomiting.  She denied palpit
ation.  She denied diarrhea.  She denied any PND, orthopnea.  There were no seizure activities that w
ere witnessed by anyone.



Past Medical History:  As stated above.



Allergies:  SHE IS ALLERGIC TO PENICILLIN AND CODEINE.



Review of Systems:

Negative.



Social History:  Negative.



Family History:  Noncontributory.



Medications:  At home include carvedilol 25 mg b.i.d.  She takes amlodipine 5 mg daily.  She takes ol
mesartan/hydrochlorothiazide 40/12.5 mg daily.  She takes levothyroxine 75 mcg daily.  She takes aten
olol also.



Physical Examination:

Vital Signs:  Stable.  She was afebrile. 

HEENT:  Negative. 

Neck:  Supple without any bruit, lymphadenopathy, JVD, or thyromegaly. 

Chest:  Clear to auscultation and percussion. 

Cardiac:  Revealed a regular rhythm and rate.  No gallops or rubs.  She had a 2/6 systolic ejection m
urmur at the third intercostal space that radiated to the carotid.  There was an S4 gallops. 

Abdomen:  Benign. 

Extremities:  Revealed no clubbing, cyanosis, or edema.



Laboratory Data:  Her laboratory evaluation showed a D-dimer of 1816 and potassium was 2.7.  Her trop
onin is 0.12.  She did have what may have been UTI as well.



Impression And Plan:  

1.Syncope.

2.Possible urinary tract infection.

3.Elevated D-dimer.

4.Elevated troponin.

5.Hypertension.

6.Hypokalemia.

7.Hypothyroidism.  The patient has already had an echocardiogram that showed mild aortic stenosis of
 1.8 sq cm.  She had a carotid Doppler that was unremarkable.  Her chest x-ray showed no acute proces
s.  She had a CT angiogram of the chest that showed no pulmonary embolus.  She had an extremity venou
s Doppler that was negative for deep venous thrombosis.  I truly believe Ms. Suggs had orthostatic 
hypotension causing her syncope.  Certainly with her hypokalemia, she could have had an arrhythmia as
 well.  Nevertheless, I would definitely change her medical regimen and this was discussed with Dr. LISSETTE salvador in details.  I would continue the carvedilol, maybe take her off the amlodipine, discontinue her 
hydrochlorothiazide and see how she does.  I will make arrangements for her to see me in the office i
n the near future and make sure she has a 7-day event monitor to rule out sick sinus syndrome.  The p
atient can go home whenever it is okay with Dr. Rey.





NB/KAYLENE

DD:  04/08/2021 11:14:54Voice ID:  [id number]

DT:  04/08/2021 14:14:11Report ID:  [id number]